# Patient Record
Sex: FEMALE | Race: WHITE | NOT HISPANIC OR LATINO | Employment: FULL TIME | URBAN - METROPOLITAN AREA
[De-identification: names, ages, dates, MRNs, and addresses within clinical notes are randomized per-mention and may not be internally consistent; named-entity substitution may affect disease eponyms.]

---

## 2025-03-03 ENCOUNTER — OFFICE VISIT (OUTPATIENT)
Dept: URGENT CARE | Age: 45
End: 2025-03-03
Payer: COMMERCIAL

## 2025-03-03 VITALS
SYSTOLIC BLOOD PRESSURE: 124 MMHG | HEIGHT: 60 IN | DIASTOLIC BLOOD PRESSURE: 90 MMHG | WEIGHT: 160 LBS | TEMPERATURE: 98.6 F | BODY MASS INDEX: 31.41 KG/M2 | RESPIRATION RATE: 19 BRPM | HEART RATE: 95 BPM | OXYGEN SATURATION: 97 %

## 2025-03-03 DIAGNOSIS — R21 RASH: Primary | ICD-10-CM

## 2025-03-03 PROCEDURE — 99203 OFFICE O/P NEW LOW 30 MIN: CPT | Performed by: PHYSICIAN ASSISTANT

## 2025-03-03 PROCEDURE — 3008F BODY MASS INDEX DOCD: CPT | Performed by: PHYSICIAN ASSISTANT

## 2025-03-03 RX ORDER — HYDROXYZINE HYDROCHLORIDE 10 MG/1
TABLET, FILM COATED ORAL
COMMUNITY
Start: 2025-02-28

## 2025-03-03 RX ORDER — HYDROCORTISONE 25 MG/G
CREAM TOPICAL
COMMUNITY
Start: 2025-02-28

## 2025-03-03 RX ORDER — CLOBETASOL PROPIONATE 0.5 MG/ML
SOLUTION TOPICAL
COMMUNITY
Start: 2025-02-28

## 2025-03-03 RX ORDER — BETAMETHASONE DIPROPIONATE 0.5 MG/G
CREAM TOPICAL
COMMUNITY
Start: 2025-02-28

## 2025-03-03 RX ORDER — METHYLPREDNISOLONE 4 MG/1
TABLET ORAL
Qty: 21 TABLET | Refills: 0 | Status: SHIPPED | OUTPATIENT
Start: 2025-03-03 | End: 2025-03-10

## 2025-03-03 NOTE — PROGRESS NOTES
Subjective   Patient ID: Christin Fung is a 44 y.o. female. They present today with a chief complaint of Other (Went to derm for eczema. Took meds and started getting itchy, red hives on face and chest.).    History of Present Illness  HPI    44-year-old patient presents to clinic with complaints of new onset of hives and papular erythematous rash on the face as of 2 days ago which was preceded by scaling rash at scalp and posterior scalp 1 week ago which was diagnosed as eczema at the dermatologist and was treated with betamethasone cream, clobetasol shampoo solution, hydrocortisone 2.5%, and hydroxyzine with some improvement until suddenly rash on the face showed up 2 days ago.  Reports has tried all products prescribed by dermatology without relief.  Reports has also tried 2 Claritin's in the morning without relief.  Denies fevers, chills, throat swelling, tongue swelling, lip swelling, dizziness, shortness of breath.  Past Medical History  Allergies as of 03/03/2025    (No Known Allergies)       (Not in a hospital admission)       Past Medical History:   Diagnosis Date    Other specified health status     No pertinent past medical history       Past Surgical History:   Procedure Laterality Date    CERVICAL BIOPSY  W/ LOOP ELECTRODE EXCISION  04/30/2018    Cervical Loop Electrosurgical Excision (LEEP)    OTHER SURGICAL HISTORY  04/30/2018    Dental Surgery            Review of Systems  Review of Systems     ROS negative with the exception as noted on HPI                            Objective    Vitals:    03/03/25 1233   BP: 124/90   BP Location: Left arm   Patient Position: Sitting   BP Cuff Size: Adult   Pulse: 95   Resp: 19   Temp: 37 °C (98.6 °F)   TempSrc: Oral   SpO2: 97%   Weight: 72.6 kg (160 lb)   Height: 1.524 m (5')     No LMP recorded.    Physical Exam  Constitutional:       Appearance: Normal appearance.   HENT:      Head: Normocephalic and atraumatic.   Cardiovascular:      Rate and Rhythm: Normal  rate and regular rhythm.      Pulses: Normal pulses.      Heart sounds: Normal heart sounds.   Pulmonary:      Effort: Pulmonary effort is normal. No respiratory distress.      Breath sounds: Normal breath sounds. No stridor. No wheezing, rhonchi or rales.   Skin:     Comments: Erythematous papular and plaque like rash on face with some flaking.    Neurological:      Mental Status: She is alert.         Procedures    Point of Care Test & Imaging Results from this visit  No results found for this visit on 03/03/25.   No results found.    Diagnostic study results (if any) were reviewed by Sara Segovia PA-C.    Assessment/Plan   Allergies, medications, history, and pertinent labs/EKGs/Imaging reviewed by Sara Segovia PA-C.   new onset of hives and papular erythematous rash on the face as of 2 days ago which was preceded by scaling rash at scalp and posterior scalp 1 week ago which was diagnosed as eczema at the dermatologist and was treated with betamethasone cream, clobetasol shampoo solution, hydrocortisone 2.5%, and hydroxyzine with some improvement until suddenly rash on the face showed up 2 days ago.   Medrol Dosepak started.  Advised patient to  betamethasone and hide hydrocortisone cream.  Advised may continue with hydroxyzine and Claritin.  Advised to follow-up with dermatology.   Keep area clean and dry.  Avoid scratching.  May use calamine lotion for symptomatic care.   Avoid NSAIDs while taking oral steroids. Risk, benefits, and potential side effects of medication(s) discussed with pt. Discussed disease/illness presentation, treatment options, progression, complications, and outcomes with patient. Pt. Has expressed understanding and is an agreement of plan of care.    Medical Decision Making      Orders and Diagnoses  Diagnoses and all orders for this visit:  Rash  -     methylPREDNISolone (Medrol Dospak) 4 mg tablets; Follow schedule on package instructions      Medical Admin  Record      Patient disposition: Home    Electronically signed by Sara Segovia PA-C  12:44 PM

## 2025-04-07 ENCOUNTER — OFFICE VISIT (OUTPATIENT)
Dept: URGENT CARE | Age: 45
End: 2025-04-07
Payer: COMMERCIAL

## 2025-04-07 VITALS
RESPIRATION RATE: 16 BRPM | TEMPERATURE: 99 F | HEART RATE: 89 BPM | OXYGEN SATURATION: 99 % | SYSTOLIC BLOOD PRESSURE: 151 MMHG | DIASTOLIC BLOOD PRESSURE: 89 MMHG

## 2025-04-07 DIAGNOSIS — R30.0 DYSURIA: Primary | ICD-10-CM

## 2025-04-07 LAB
POC APPEARANCE, URINE: CLEAR
POC BILIRUBIN, URINE: NEGATIVE
POC BLOOD, URINE: ABNORMAL
POC COLOR, URINE: YELLOW
POC GLUCOSE, URINE: NEGATIVE MG/DL
POC KETONES, URINE: ABNORMAL MG/DL
POC LEUKOCYTES, URINE: NEGATIVE
POC NITRITE,URINE: NEGATIVE
POC PH, URINE: 6 PH
POC PROTEIN, URINE: NEGATIVE MG/DL
POC SPECIFIC GRAVITY, URINE: >=1.03
POC UROBILINOGEN, URINE: 0.2 EU/DL
PREGNANCY TEST URINE, POC: NEGATIVE

## 2025-04-07 PROCEDURE — 99213 OFFICE O/P EST LOW 20 MIN: CPT

## 2025-04-07 PROCEDURE — 81003 URINALYSIS AUTO W/O SCOPE: CPT

## 2025-04-07 PROCEDURE — 81025 URINE PREGNANCY TEST: CPT

## 2025-04-07 PROCEDURE — 1036F TOBACCO NON-USER: CPT

## 2025-04-07 RX ORDER — NITROFURANTOIN 25; 75 MG/1; MG/1
100 CAPSULE ORAL EVERY 12 HOURS SCHEDULED
Qty: 14 CAPSULE | Refills: 0 | Status: SHIPPED | OUTPATIENT
Start: 2025-04-07 | End: 2025-04-14

## 2025-04-07 ASSESSMENT — ENCOUNTER SYMPTOMS
VOMITING: 0
CHILLS: 0
HEMATURIA: 0
FLANK PAIN: 0
DYSURIA: 1
FEVER: 0
ABDOMINAL PAIN: 0

## 2025-04-07 NOTE — PROGRESS NOTES
Subjective   Patient ID: Christin Fung is a 44 y.o. female. They present today with a chief complaint of vaginal itching x 3 weeks  and pressure post urination.    HISTORY OF PRESENT ILLNESS:    Presents to the clinic for dysuria (pressure at the end of the urinary stream), vaginal itch, and increased urgency. States the vaginal itch has improved after applying a steroid cream as advised by her GYN office; however, now she believes that she may have a UTI. Denies hx of UTI. No fevers, nausea/vomiting, or flank pain. Patient is going through menopause. No concern for STI.     Past Medical History  Allergies as of 04/07/2025    (No Known Allergies)       Current Outpatient Medications   Medication Instructions    betamethasone dipropionate 0.05 % cream PLEASE SEE ATTACHED FOR DETAILED DIRECTIONS    clobetasol (Temovate) 0.05 % external solution PLEASE SEE ATTACHED FOR DETAILED DIRECTIONS    hydrocortisone 2.5 % cream PLEASE SEE ATTACHED FOR DETAILED DIRECTIONS    hydrOXYzine HCL (Atarax) 10 mg tablet TAKE ONE TABLET AT BEDTIME AS NEEDED FOR ITCH    nitrofurantoin, macrocrystal-monohydrate, (Macrobid) 100 mg capsule 100 mg, oral, Every 12 hours scheduled, Take with food.         Past Medical History:   Diagnosis Date    Other specified health status     No pertinent past medical history       Past Surgical History:   Procedure Laterality Date    CERVICAL BIOPSY  W/ LOOP ELECTRODE EXCISION  04/30/2018    Cervical Loop Electrosurgical Excision (LEEP)    OTHER SURGICAL HISTORY  04/30/2018    Dental Surgery        reports that she has never smoked. She has never used smokeless tobacco.    Review of Systems    Review of Systems   Constitutional:  Negative for chills and fever.   Gastrointestinal:  Negative for abdominal pain and vomiting.   Genitourinary:  Positive for dysuria and urgency. Negative for flank pain, hematuria and vaginal discharge.        (+) vaginal itch                                 Objective    Vitals:     04/07/25 1210   BP: 151/89   Pulse: 89   Resp: 16   Temp: 37.2 °C (99 °F)   SpO2: 99%     Patient's last menstrual period was 04/16/2024.  PHYSICAL EXAMINATION:    Physical Exam  Vitals and nursing note reviewed.   Constitutional:       General: She is not in acute distress.     Appearance: Normal appearance. She is not ill-appearing.   HENT:      Head: Normocephalic and atraumatic.      Nose: Nose normal.   Eyes:      General:         Right eye: No discharge.         Left eye: No discharge.      Extraocular Movements: Extraocular movements intact.      Conjunctiva/sclera: Conjunctivae normal.   Cardiovascular:      Rate and Rhythm: Normal rate.   Pulmonary:      Effort: Pulmonary effort is normal. No respiratory distress.   Musculoskeletal:      Cervical back: Normal range of motion and neck supple.   Skin:     General: Skin is warm and dry.   Neurological:      General: No focal deficit present.      Mental Status: She is alert and oriented to person, place, and time.      Gait: Gait normal.   Psychiatric:         Mood and Affect: Mood normal.         Behavior: Behavior normal.          Procedures    Results for orders placed or performed in visit on 04/07/25   POCT pregnancy, urine manually resulted   Result Value Ref Range    Preg Test, Ur Negative Negative   POCT UA Automated manually resulted   Result Value Ref Range    POC Color, Urine Yellow Straw, Yellow, Light-Yellow    POC Appearance, Urine Clear Clear    POC Glucose, Urine NEGATIVE NEGATIVE mg/dl    POC Bilirubin, Urine NEGATIVE NEGATIVE    POC Ketones, Urine TRACE (A) NEGATIVE mg/dl    POC Specific Gravity, Urine >=1.030 1.005 - 1.035    POC Blood, Urine TRACE-Intact (A) NEGATIVE    POC PH, Urine 6.0 No Reference Range Established PH    POC Protein, Urine NEGATIVE NEGATIVE mg/dl    POC Urobilinogen, Urine 0.2 0.2, 1.0 EU/DL    Poc Nitrite, Urine NEGATIVE NEGATIVE    POC Leukocytes, Urine NEGATIVE NEGATIVE       Diagnostic study results (if any) were  reviewed by Iman Lay PA-C.    Assessment/Plan   Allergies, medications, history, and pertinent labs/EKGs/Imaging reviewed by Iman Lay PA-C.     Orders and Diagnoses  Diagnoses and all orders for this visit:  Dysuria  -     POCT pregnancy, urine manually resulted  -     POCT UA Automated manually resulted  -     Urine Culture  -     Vaginitis Gram Stain For Bacterial Vaginosis + Yeast  -     nitrofurantoin, macrocrystal-monohydrate, (Macrobid) 100 mg capsule; Take 1 capsule (100 mg) by mouth every 12 hours for 7 days. Take with food.  -     Referral to Primary Care; Future      Medical Admin Record    Given overall well appearance, vital signs, history, and exam as above, there is no indication for further emergent testing/intervention at this time.      I discussed with the patient my clinical thoughts at this time given the above and we had a shared decision-making conversation in a patient-centered decision-making model on how to proceed forward. Pt was instructed on the importance of close follow-up. They were told that an urgent care diagnosis is often a preliminary impression and that definitive care is often not able to be given in the urgent care setting. Pt was educated that close follow-up is essential for good health and good outcomes. Patient was provided with the following instructions:         Await urine cx and BV/yeast test.    May begin abx as directed.       May take AZO for symptomatic relief as needed, for up to 3 days.     Plenty of fluids and rest.      Follow up with OB/GYN in the next 7-10 days if sx fail to show adequate improvement, especially if all testing returns negative.        Clinical impression as well as limitations of available testing/examination, all discussed with patient. Pt is well at this time in the urgent care. They are comfortable with the present assessment and plan to be discharged home. Discussed with them close outpatient follow up, reassessment, and  possible further testing/treatment via their PCP/specialist if symptoms fail to improve; they agree, understand, and are comfortable with this plan. Pt given the opportunity to ask questions prior to discharge and all questions were answered at this time. Via our discussion, the patient was advised of warning signs and instructions were reviewed. Strict ED precautions were given, acknowledged, and understood. Discussed with the patient/family that it is okay to return to the urgent care at any time for anything. Advised to present to the ED if present condition changes/worsens or if they develop new symptoms at any time after discharge. Also, advised to go to the ED if they cannot establish outpatient follow-up in time frame specified above. Pt verbalized understanding and agreement with plan and instructions. Discussed the need for close follow up with their primary care provider and/or specialist for further testing/treatment/care/consultation in the short time frame as noted above, if needed - they understand these instructions and agree to close follow up for these reasons. Patient discharged home in stable condition.      Follow Up Instructions  No follow-ups on file.    Patient disposition: Home    Electronically signed by Iman Lay PA-C  6:01 PM

## 2025-04-08 LAB — BV SCORE VAG QL: NORMAL

## 2025-04-09 LAB — BACTERIA UR CULT: NORMAL
